# Patient Record
(demographics unavailable — no encounter records)

---

## 2025-05-01 NOTE — ASSESSMENT
[FreeTextEntry1] : Referred pt for screening colonoscopy. 4 days of Linzess 72 mcg samples provided, instructed pt to take once daily in the morning for 4 days prior to Colonoscopy. Pt expressed understanding and agrees with the plan.  A colonoscopy is an exam of the lower part of the gastrointestinal tract, which is called the colon or large intestine (bowel). Colonoscopy is a safe procedure that provides information that other tests may not be able to give.  Colonoscopy is performed by inserting a device called a colonoscope into the anus and advancing through the entire colon. The procedure generally takes between 20 minutes and one hour.  Other tests that are sometimes used to screen for colon cancer, like virtual colonoscopy (also called CT colonography), were discussed separately.  REASONS FOR COLONOSCOPY:  The most common reasons for colonoscopy are:  1. To screen for colon polyps (growths of tissue in the colon) or colon cancer  2. Rectal bleeding  3. A change in bowel habits, like persistent diarrhea  4. Iron deficiency anemia (a decrease in blood count due to loss of iron)  5. A family history of colon cancer  6. A personal history of colon polyps or colon cancer  7. Chronic, unexplained abdominal or rectal pain  8. An abnormal X-Ray exam, like a barium enema or CT scan.   COLONOSCOPY PREPARATION: Before colonoscopy, your colon must be completely cleaned out so that the doctor can see any abnormal areas. This is vitally important to increase the chances that your doctor will identify abnormalities in your colon. If your colon is not completely cleaned out, the chances your doctor will miss abnormalities increases. Your doctor's office will provide specific instructions about how you should prepare for your colonoscopy. Be sure to read these instructions as soon as you get them so you will know how to take the preparation and whether you need to make any changes to your medications or diet. If you have questions, call the doctor's office in advance.  I spent 45 minutes with the patient as well as reviewing documents prior to and after the office visit. Patient verbalized understanding of all information provided. All questions answered and reviewed.  Robert Brunner, MD

## 2025-05-01 NOTE — PHYSICAL EXAM

## 2025-05-01 NOTE — HISTORY OF PRESENT ILLNESS
[FreeTextEntry1] : Thank you for consult.  Patient is a 75 y/o male with a PMHx of Asthma, CAD with 25% LAD Stenosis s/p cardiac cath (1 month ago, no stents), hypertension, osteoarthritis, and b/l Inguinal hernia s/p laparoscopic Inguinal Hernia Repair, who presents for screening colonoscopy. Pt is currently asymptomatic and feeling well. Pt admits to smoking cigarettes regularly and consuming 2-3 beers daily. Pt is currently asymptomatic and feeling well. Last Colonoscopy was 2018 at an external facility, which showed some polyps, which were removed, and pt states prep was poor. As per pt, he was instructed to f/u in 2 years for next Colonoscopy but he did not f/u until today. Pt has never had an EGD.